# Patient Record
Sex: FEMALE | Race: BLACK OR AFRICAN AMERICAN | Employment: UNEMPLOYED
[De-identification: names, ages, dates, MRNs, and addresses within clinical notes are randomized per-mention and may not be internally consistent; named-entity substitution may affect disease eponyms.]

---

## 2023-10-03 ENCOUNTER — HOSPITAL ENCOUNTER (OUTPATIENT)
Facility: HOSPITAL | Age: 63
Discharge: HOME | End: 2023-10-06
Payer: MEDICAID

## 2023-10-03 DIAGNOSIS — M17.0 PRIMARY OSTEOARTHRITIS OF BOTH KNEES: ICD-10-CM

## 2023-10-03 PROCEDURE — 73565 X-RAY EXAM OF KNEES: CPT

## 2024-09-09 ENCOUNTER — TRANSCRIBE ORDERS (OUTPATIENT)
Facility: HOSPITAL | Age: 64
End: 2024-09-09

## 2024-09-09 DIAGNOSIS — Z78.0 POSTMENOPAUSAL: Primary | ICD-10-CM

## 2025-01-09 NOTE — PROGRESS NOTES
of left knee  M25.562 Ambulatory Referral to Ortho Injection    G89.29 DRAIN/INJECT LARGE JOINT/BURSA     betamethasone acetate-betamethasone sodium phosphate (CELESTONE) injection 3 mg     BUPivacaine (MARCAINE) 0.5 % injection 20 mg     diclofenac sodium (VOLTAREN) 1 % GEL      3. Unilateral primary osteoarthritis, right knee  M17.11 Ambulatory Referral to Ortho Injection     DRAIN/INJECT LARGE JOINT/BURSA     betamethasone acetate-betamethasone sodium phosphate (CELESTONE) injection 3 mg     BUPivacaine (MARCAINE) 0.5 % injection 20 mg     diclofenac sodium (VOLTAREN) 1 % GEL      4. Unilateral primary osteoarthritis, left knee  M17.12 Ambulatory Referral to Ortho Injection     DRAIN/INJECT LARGE JOINT/BURSA     betamethasone acetate-betamethasone sodium phosphate (CELESTONE) injection 3 mg     BUPivacaine (MARCAINE) 0.5 % injection 20 mg     diclofenac sodium (VOLTAREN) 1 % GEL      5. BMI 37.0-37.9, adult  Z68.37 [facility] - Surgical Weight Loss, [place] ([facility])        PLAN:  Patient's knees injected 4 cc 0.25% Marcaine and 0.5 cc Celestone. Voltaren gel Rx provided. Recommended a low carbohydrate diet and exercise for weight loss. Bariatric consultation. Permanent handicapped parking permit form was filled out today. Consider viscosupplementation if pain continues.  Follow up PRN.    Documentation by pina Chaves, as documented by Cristian Gill MD.

## 2025-01-13 ENCOUNTER — OFFICE VISIT (OUTPATIENT)
Age: 65
End: 2025-01-13

## 2025-01-13 VITALS — WEIGHT: 245 LBS | TEMPERATURE: 96.9 F | HEIGHT: 68 IN | BODY MASS INDEX: 37.13 KG/M2

## 2025-01-13 DIAGNOSIS — M25.562 CHRONIC PAIN OF LEFT KNEE: ICD-10-CM

## 2025-01-13 DIAGNOSIS — M17.11 UNILATERAL PRIMARY OSTEOARTHRITIS, RIGHT KNEE: ICD-10-CM

## 2025-01-13 DIAGNOSIS — M17.12 UNILATERAL PRIMARY OSTEOARTHRITIS, LEFT KNEE: ICD-10-CM

## 2025-01-13 DIAGNOSIS — G89.29 CHRONIC PAIN OF LEFT KNEE: ICD-10-CM

## 2025-01-13 DIAGNOSIS — M25.561 CHRONIC PAIN OF RIGHT KNEE: Primary | ICD-10-CM

## 2025-01-13 DIAGNOSIS — G89.29 CHRONIC PAIN OF RIGHT KNEE: Primary | ICD-10-CM

## 2025-01-13 PROCEDURE — 99203 OFFICE O/P NEW LOW 30 MIN: CPT | Performed by: SPECIALIST

## 2025-01-13 PROCEDURE — 20610 DRAIN/INJ JOINT/BURSA W/O US: CPT | Performed by: SPECIALIST

## 2025-01-13 PROCEDURE — 1123F ACP DISCUSS/DSCN MKR DOCD: CPT | Performed by: SPECIALIST

## 2025-01-13 RX ORDER — BUPIVACAINE HYDROCHLORIDE 5 MG/ML
4 INJECTION, SOLUTION PERINEURAL ONCE
Status: COMPLETED | OUTPATIENT
Start: 2025-01-13 | End: 2025-01-13

## 2025-01-13 RX ORDER — BETAMETHASONE SODIUM PHOSPHATE AND BETAMETHASONE ACETATE 3; 3 MG/ML; MG/ML
3 INJECTION, SUSPENSION INTRA-ARTICULAR; INTRALESIONAL; INTRAMUSCULAR; SOFT TISSUE ONCE
Status: COMPLETED | OUTPATIENT
Start: 2025-01-13 | End: 2025-01-13

## 2025-01-13 RX ADMIN — BETAMETHASONE SODIUM PHOSPHATE AND BETAMETHASONE ACETATE 3 MG: 3; 3 INJECTION, SUSPENSION INTRA-ARTICULAR; INTRALESIONAL; INTRAMUSCULAR; SOFT TISSUE at 10:29

## 2025-01-13 RX ADMIN — BUPIVACAINE HYDROCHLORIDE 20 MG: 5 INJECTION, SOLUTION PERINEURAL at 10:29

## 2025-01-13 RX ADMIN — BETAMETHASONE SODIUM PHOSPHATE AND BETAMETHASONE ACETATE 3 MG: 3; 3 INJECTION, SUSPENSION INTRA-ARTICULAR; INTRALESIONAL; INTRAMUSCULAR; SOFT TISSUE at 10:28

## 2025-01-13 RX ADMIN — BUPIVACAINE HYDROCHLORIDE 20 MG: 5 INJECTION, SOLUTION PERINEURAL at 10:30

## 2025-01-23 ENCOUNTER — CLINICAL DOCUMENTATION (OUTPATIENT)
Age: 65
End: 2025-01-23

## 2025-01-23 NOTE — PROGRESS NOTES
Patient inquired about receiving a bill for 1/13/25 office visit, insurance was corrected in her chart today and will be submitted for re-bill.

## 2025-02-18 NOTE — PROGRESS NOTES
Intra-artICUlar Once        PHYSICAL EXAMINATION:  Resp 16   Ht 1.727 m (5' 8\")   BMI 37.25 kg/m²      ORTHO EXAMINATION:  Examination Right knee Left knee   Skin Intact Intact   Range of motion 120-0 120-0   Effusion - -   Medial joint line tenderness + +   Lateral joint line tenderness - -   Popliteal tenderness - -   Osteophytes palpable + +   Nader’s - -   Patella crepitus + +   Anterior drawer - -   Lateral laxity - -   Medial laxity - -   Varus deformity - -   Valgus deformity - -   Pretibial edema - -   Calf tenderness - -      RADIOGRAPHS:  10/3/23 XR BILAT KNEE 3V [facility]  No acute fracture or dislocation. Moderate tricompartmental osteoarthritis as above.  I independently reviewed these images today.    PROCEDURE: Patient's knees injected with 2 cc of Gelsyn-3.   Chart reviewed for the following:   Cristian JALLOH MD, have reviewed the History, Physical and updated the Allergic reactions for Noel Jones     TIME OUT performed immediately prior to start of procedure:  Cristian JALLOH MD, have performed the following reviews on Noel Jones prior to the start of the procedure:            * Patient was identified by name and date of birth   * Agreement on procedure being performed was verified  * Risks and Benefits explained to the patient  * Procedure site verified and marked as necessary  * Patient was positioned for comfort  * Consent was obtained  Time: 10:03 AM  Date of procedure: 2/24/2025    Procedure performed by:  Dr. Gill    Ms. Jones tolerated the procedure well with no complications.    IMPRESSION:      ICD-10-CM    1. Unilateral primary osteoarthritis, left knee  M17.12 DRAIN/INJECT LARGE JOINT/BURSA     sodium hyaluronate (Viscosup) injection SOSY 16.8 mg      2. Unilateral primary osteoarthritis, right knee  M17.11 DRAIN/INJECT LARGE JOINT/BURSA     sodium hyaluronate (Viscosup) injection SOSY 16.8 mg        PLAN: Patient's knees injected with 2 cc of Gelsyn-3.

## 2025-02-24 ENCOUNTER — OFFICE VISIT (OUTPATIENT)
Age: 65
End: 2025-02-24
Payer: MEDICARE

## 2025-02-24 VITALS — BODY MASS INDEX: 37.25 KG/M2 | RESPIRATION RATE: 16 BRPM | HEIGHT: 68 IN

## 2025-02-24 DIAGNOSIS — M17.12 UNILATERAL PRIMARY OSTEOARTHRITIS, LEFT KNEE: Primary | ICD-10-CM

## 2025-02-24 DIAGNOSIS — M17.11 UNILATERAL PRIMARY OSTEOARTHRITIS, RIGHT KNEE: ICD-10-CM

## 2025-02-24 PROCEDURE — 20610 DRAIN/INJ JOINT/BURSA W/O US: CPT | Performed by: SPECIALIST

## 2025-02-24 NOTE — PROGRESS NOTES
Patient: Noel Joens                MRN: [id number]       SSN: [id number]  YOB: 1960        AGE: 64 y.o.        SEX: female  Body mass index is 36.8 kg/m².    PCP: Paulina Montaño MD  02/28/25    Chief Complaint   Patient presents with    Knee Pain     bilateral     HISTORY:  Noel Jones is a 64 y.o. female who is seen for bilateral knee pain. She presents today for her second injection in the Gelsyn-3 visco supplementation series.      ICD-10-CM    1. Unilateral primary osteoarthritis, left knee  M17.12 DRAIN/INJECT LARGE JOINT/BURSA     sodium hyaluronate (Viscosup) injection SOSY 16.8 mg      2. Unilateral primary osteoarthritis, right knee  M17.11 DRAIN/INJECT LARGE JOINT/BURSA     sodium hyaluronate (Viscosup) injection SOSY 16.8 mg         PROCEDURE:  Ms. Orosco knees injected with 2 cc of Gelsyn-3.     Chart reviewed for the following:   Cristian JALLOH MD, have reviewed the History, Physical and updated the Allergic reactions for Noel Jones     TIME OUT performed immediately prior to start of procedure:  Cristian JALLOH MD, have performed the following reviews on Noel Jones prior to the start of the procedure:            * Patient was identified by name and date of birth   * Agreement on procedure being performed was verified  * Risks and Benefits explained to the patient  * Procedure site verified and marked as necessary  * Patient was positioned for comfort  * Consent was obtained     Time: 10:46 AM     Date of procedure: 2/28/2025    Procedure performed by:  Cristian Gill MD    Ms. Jones tolerated the procedure well with no complications.    PLAN: Ms. Jones's knees injected with 2 cc of Gelsyn-3. Ms. Jones will follow up in one week to complete her visco supplementation injection series.    Documentation by pina Chaves, as documented by Cristian Gill MD.

## 2025-02-28 ENCOUNTER — OFFICE VISIT (OUTPATIENT)
Age: 65
End: 2025-02-28

## 2025-02-28 VITALS — WEIGHT: 242 LBS | BODY MASS INDEX: 36.8 KG/M2

## 2025-02-28 DIAGNOSIS — M17.11 UNILATERAL PRIMARY OSTEOARTHRITIS, RIGHT KNEE: ICD-10-CM

## 2025-02-28 DIAGNOSIS — M17.12 UNILATERAL PRIMARY OSTEOARTHRITIS, LEFT KNEE: Primary | ICD-10-CM

## 2025-03-12 NOTE — PROGRESS NOTES
Patient: Noel Jones                MRN: [id number]       SSN: [id number]  YOB: 1960        AGE: 64 y.o.        SEX: female  There is no height or weight on file to calculate BMI.    PCP: Paulina Montaño MD  03/13/25    Chief Complaint   Patient presents with    Knee Pain     bilateral     HISTORY:  Noel Jones is a 64 y.o. female who is seen for bilateral knee pain. She presents today for her third injection in the Gelsyn-3 visco supplementation series.    PROCEDURE:  Ms. Jones's knees injected with 2 cc of Gelsyn-3.     TIME OUT performed immediately prior to start of procedure:  I, Cristian Gill MD, have performed the following reviews on Noel Jones prior to the start of the procedure:            * Patient was identified by name and date of birth   * Agreement on procedure being performed was verified  * Risks and Benefits explained to the patient  * Procedure site verified and marked as necessary  * Patient was positioned for comfort  * Consent was obtained     Time: 9:45 AM     Date of procedure: 3/13/2025    Procedure performed by:  Cristian Gill MD    Ms. Jones tolerated the procedure well with no complications      ICD-10-CM    1. Unilateral primary osteoarthritis, right knee  M17.11 DRAIN/INJECT LARGE JOINT/BURSA     sodium hyaluronate (Viscosup) injection SOSY 16.8 mg     DISCONTINUED: sodium hyaluronate (EUFLEXXA, HYALGAN) injection 20 mg      2. Unilateral primary osteoarthritis, left knee  M17.12 DRAIN/INJECT LARGE JOINT/BURSA     sodium hyaluronate (Viscosup) injection SOSY 16.8 mg     DISCONTINUED: sodium hyaluronate (EUFLEXXA, HYALGAN) injection 20 mg        PLAN: Ms. Orosco knees injected with 2 cc of Gelsyn-3. Ms. Jones will follow up PRN now that she has completed her visco supplementation injection series.     Documentation by pina Chaves, as documented by Cristian Gill MD.

## 2025-03-13 ENCOUNTER — OFFICE VISIT (OUTPATIENT)
Age: 65
End: 2025-03-13

## 2025-03-13 DIAGNOSIS — M17.12 UNILATERAL PRIMARY OSTEOARTHRITIS, LEFT KNEE: ICD-10-CM

## 2025-03-13 DIAGNOSIS — M17.11 UNILATERAL PRIMARY OSTEOARTHRITIS, RIGHT KNEE: Primary | ICD-10-CM

## 2025-03-13 RX ORDER — HYALURONATE SODIUM 10 MG/ML
20 SYRINGE (ML) INTRAARTICULAR ONCE
Status: DISCONTINUED | OUTPATIENT
Start: 2025-03-13 | End: 2025-03-13

## 2025-07-24 ENCOUNTER — OFFICE VISIT (OUTPATIENT)
Age: 65
End: 2025-07-24

## 2025-07-24 VITALS — HEIGHT: 68 IN | BODY MASS INDEX: 36.8 KG/M2 | WEIGHT: 242.8 LBS

## 2025-07-24 DIAGNOSIS — G89.29 CHRONIC PAIN OF RIGHT KNEE: ICD-10-CM

## 2025-07-24 DIAGNOSIS — M17.11 UNILATERAL PRIMARY OSTEOARTHRITIS, RIGHT KNEE: Primary | ICD-10-CM

## 2025-07-24 DIAGNOSIS — M25.562 CHRONIC PAIN OF LEFT KNEE: ICD-10-CM

## 2025-07-24 DIAGNOSIS — M25.561 CHRONIC PAIN OF RIGHT KNEE: ICD-10-CM

## 2025-07-24 DIAGNOSIS — G89.29 CHRONIC PAIN OF LEFT KNEE: ICD-10-CM

## 2025-07-24 DIAGNOSIS — M17.12 UNILATERAL PRIMARY OSTEOARTHRITIS, LEFT KNEE: ICD-10-CM

## 2025-07-24 RX ORDER — BUPIVACAINE HYDROCHLORIDE 5 MG/ML
4 INJECTION, SOLUTION PERINEURAL ONCE
Status: COMPLETED | OUTPATIENT
Start: 2025-07-24 | End: 2025-07-24

## 2025-07-24 RX ORDER — BETAMETHASONE SODIUM PHOSPHATE AND BETAMETHASONE ACETATE 3; 3 MG/ML; MG/ML
3 INJECTION, SUSPENSION INTRA-ARTICULAR; INTRALESIONAL; INTRAMUSCULAR; SOFT TISSUE ONCE
Status: COMPLETED | OUTPATIENT
Start: 2025-07-24 | End: 2025-07-24

## 2025-07-24 RX ADMIN — BETAMETHASONE SODIUM PHOSPHATE AND BETAMETHASONE ACETATE 3 MG: 3; 3 INJECTION, SUSPENSION INTRA-ARTICULAR; INTRALESIONAL; INTRAMUSCULAR; SOFT TISSUE at 09:48

## 2025-07-24 RX ADMIN — BUPIVACAINE HYDROCHLORIDE 20 MG: 5 INJECTION, SOLUTION PERINEURAL at 09:49

## 2025-07-24 NOTE — PROGRESS NOTES
Patient: Noel Jones                MRN: [id number]       SSN: [id number]  YOB: 1960        AGE: 65 y.o.        SEX: female      PCP: Paulina Montaño MD  07/24/25    Chief Complaint   Patient presents with    Knee Pain     Bilateral      HISTORY:  Noel Jones is a 65 y.o. female who is seen for increased bilateral knee pain. She successfully completed a bilateral Gelsyn-3 series on 3/13/25 but her knee pains have returned. She denies any recent injury. Her knee pain so severe she is unable to sleep at night. She feels pain with standing, walking and stair climbing.  She experiences startup pain after sitting.  She previously responded to cortisone injections by her PCP Dr. Bales at [facility]. Meloxicam helps some. She is also taking Osteo bi-flex and Calcium.      Occupation, etc: Ms. Jones  works as a  for [employer]. She had previously retired but went back to work. 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 She plans to continue working for 2 more years. She lives in [place] with her .  She has been  for 40 years. She has 2 adult daughters, 1 adult son, and 9 grandchildren. She weighs 242 lbs and is 5'8\".  She recently started taking phentermine for weight loss. She is not diabetic or hypertensive.  Wt Readings from Last 3 Encounters:   07/24/25 110.1 kg (242 lb 12.8 oz)   02/28/25 109.8 kg (242 lb)   01/13/25 111.1 kg (245 lb)      Body mass index is 36.92 kg/m².    There is no problem list on file for this patient.      Social History     Tobacco Use    Smoking status: Unknown        Allergies   Allergen Reactions    Lisinopril     Motrin [Ibuprofen]         Current Outpatient Medications   Medication Sig    diclofenac sodium (VOLTAREN) 1 % GEL Apply 4 g topically 4 times daily     Current Facility-Administered Medications   Medication Dose Route Frequency    betamethasone

## 2025-08-07 ENCOUNTER — TELEPHONE (OUTPATIENT)
Age: 65
End: 2025-08-07

## 2025-08-08 ENCOUNTER — HOSPITAL ENCOUNTER (OUTPATIENT)
Facility: HOSPITAL | Age: 65
Discharge: HOME | End: 2025-08-11
Payer: MEDICARE

## 2025-08-08 DIAGNOSIS — Z78.0 POSTMENOPAUSAL: ICD-10-CM

## 2025-08-08 PROCEDURE — 77080 DXA BONE DENSITY AXIAL: CPT

## 2025-08-22 ENCOUNTER — OFFICE VISIT (OUTPATIENT)
Age: 65
End: 2025-08-22

## 2025-08-22 VITALS — WEIGHT: 242.2 LBS | BODY MASS INDEX: 36.83 KG/M2

## 2025-08-22 DIAGNOSIS — M17.11 UNILATERAL PRIMARY OSTEOARTHRITIS, RIGHT KNEE: Primary | ICD-10-CM

## 2025-08-22 DIAGNOSIS — M17.12 UNILATERAL PRIMARY OSTEOARTHRITIS, LEFT KNEE: ICD-10-CM
